# Patient Record
Sex: FEMALE | Race: WHITE | NOT HISPANIC OR LATINO
[De-identification: names, ages, dates, MRNs, and addresses within clinical notes are randomized per-mention and may not be internally consistent; named-entity substitution may affect disease eponyms.]

---

## 2017-06-28 ENCOUNTER — RESULT REVIEW (OUTPATIENT)
Age: 42
End: 2017-06-28

## 2017-07-03 ENCOUNTER — APPOINTMENT (OUTPATIENT)
Dept: MAMMOGRAPHY | Facility: CLINIC | Age: 42
End: 2017-07-03

## 2017-07-03 ENCOUNTER — OUTPATIENT (OUTPATIENT)
Dept: OUTPATIENT SERVICES | Facility: HOSPITAL | Age: 42
LOS: 1 days | End: 2017-07-03
Payer: COMMERCIAL

## 2017-07-03 DIAGNOSIS — R92.8 OTHER ABNORMAL AND INCONCLUSIVE FINDINGS ON DIAGNOSTIC IMAGING OF BREAST: ICD-10-CM

## 2017-07-03 PROCEDURE — G0279: CPT

## 2017-07-03 PROCEDURE — 77065 DX MAMMO INCL CAD UNI: CPT

## 2017-08-16 ENCOUNTER — INPATIENT (INPATIENT)
Facility: HOSPITAL | Age: 42
LOS: 1 days | Discharge: HOME | DRG: 392 | End: 2017-08-18
Attending: SPECIALIST | Admitting: SPECIALIST
Payer: COMMERCIAL

## 2017-08-16 VITALS
OXYGEN SATURATION: 100 % | SYSTOLIC BLOOD PRESSURE: 117 MMHG | RESPIRATION RATE: 16 BRPM | TEMPERATURE: 99 F | HEART RATE: 79 BPM | DIASTOLIC BLOOD PRESSURE: 77 MMHG

## 2017-08-16 DIAGNOSIS — K57.80 DIVERTICULITIS OF INTESTINE, PART UNSPECIFIED, WITH PERFORATION AND ABSCESS WITHOUT BLEEDING: ICD-10-CM

## 2017-08-16 LAB
ALBUMIN SERPL ELPH-MCNC: 4.3 G/DL — SIGNIFICANT CHANGE UP (ref 3.3–5)
ALP SERPL-CCNC: 95 U/L — SIGNIFICANT CHANGE UP (ref 40–120)
ALT FLD-CCNC: 13 U/L RC — SIGNIFICANT CHANGE UP (ref 10–45)
ANION GAP SERPL CALC-SCNC: 17 MMOL/L — SIGNIFICANT CHANGE UP (ref 5–17)
APTT BLD: 33.7 SEC — SIGNIFICANT CHANGE UP (ref 27.5–37.4)
AST SERPL-CCNC: 18 U/L — SIGNIFICANT CHANGE UP (ref 10–40)
BASE EXCESS BLDV CALC-SCNC: -1.3 MMOL/L — SIGNIFICANT CHANGE UP (ref -2–2)
BASOPHILS # BLD AUTO: 0.1 K/UL — SIGNIFICANT CHANGE UP (ref 0–0.2)
BASOPHILS NFR BLD AUTO: 0.6 % — SIGNIFICANT CHANGE UP (ref 0–2)
BILIRUB SERPL-MCNC: 0.6 MG/DL — SIGNIFICANT CHANGE UP (ref 0.2–1.2)
BLD GP AB SCN SERPL QL: NEGATIVE — SIGNIFICANT CHANGE UP
BUN SERPL-MCNC: 11 MG/DL — SIGNIFICANT CHANGE UP (ref 7–23)
CA-I SERPL-SCNC: 1.13 MMOL/L — SIGNIFICANT CHANGE UP (ref 1.12–1.3)
CALCIUM SERPL-MCNC: 9.6 MG/DL — SIGNIFICANT CHANGE UP (ref 8.4–10.5)
CHLORIDE BLDV-SCNC: 104 MMOL/L — SIGNIFICANT CHANGE UP (ref 96–108)
CHLORIDE SERPL-SCNC: 100 MMOL/L — SIGNIFICANT CHANGE UP (ref 96–108)
CO2 BLDV-SCNC: 25 MMOL/L — SIGNIFICANT CHANGE UP (ref 22–30)
CO2 SERPL-SCNC: 21 MMOL/L — LOW (ref 22–31)
CREAT SERPL-MCNC: 0.8 MG/DL — SIGNIFICANT CHANGE UP (ref 0.5–1.3)
EOSINOPHIL # BLD AUTO: 0 K/UL — SIGNIFICANT CHANGE UP (ref 0–0.5)
EOSINOPHIL NFR BLD AUTO: 0.4 % — SIGNIFICANT CHANGE UP (ref 0–6)
GAS PNL BLDV: 132 MMOL/L — LOW (ref 136–145)
GAS PNL BLDV: SIGNIFICANT CHANGE UP
GLUCOSE BLDV-MCNC: 82 MG/DL — SIGNIFICANT CHANGE UP (ref 70–99)
GLUCOSE SERPL-MCNC: 89 MG/DL — SIGNIFICANT CHANGE UP (ref 70–99)
HCG SERPL-ACNC: <2 MIU/ML — SIGNIFICANT CHANGE UP
HCO3 BLDV-SCNC: 24 MMOL/L — SIGNIFICANT CHANGE UP (ref 21–29)
HCT VFR BLD CALC: 39 % — SIGNIFICANT CHANGE UP (ref 34.5–45)
HCT VFR BLDA CALC: 34 % — LOW (ref 39–50)
HGB BLD CALC-MCNC: 10.8 G/DL — LOW (ref 11.5–15.5)
HGB BLD-MCNC: 12.8 G/DL — SIGNIFICANT CHANGE UP (ref 11.5–15.5)
INR BLD: 1.08 RATIO — SIGNIFICANT CHANGE UP (ref 0.88–1.16)
LACTATE BLDV-MCNC: 1.1 MMOL/L — SIGNIFICANT CHANGE UP (ref 0.7–2)
LYMPHOCYTES # BLD AUTO: 19.2 % — SIGNIFICANT CHANGE UP (ref 13–44)
LYMPHOCYTES # BLD AUTO: 2.5 K/UL — SIGNIFICANT CHANGE UP (ref 1–3.3)
MCHC RBC-ENTMCNC: 30 PG — SIGNIFICANT CHANGE UP (ref 27–34)
MCHC RBC-ENTMCNC: 32.9 GM/DL — SIGNIFICANT CHANGE UP (ref 32–36)
MCV RBC AUTO: 91.3 FL — SIGNIFICANT CHANGE UP (ref 80–100)
MONOCYTES # BLD AUTO: 0.7 K/UL — SIGNIFICANT CHANGE UP (ref 0–0.9)
MONOCYTES NFR BLD AUTO: 5.4 % — SIGNIFICANT CHANGE UP (ref 2–14)
NEUTROPHILS # BLD AUTO: 9.8 K/UL — HIGH (ref 1.8–7.4)
NEUTROPHILS NFR BLD AUTO: 74.4 % — SIGNIFICANT CHANGE UP (ref 43–77)
OTHER CELLS CSF MANUAL: 11 ML/DL — LOW (ref 18–22)
PCO2 BLDV: 43 MMHG — SIGNIFICANT CHANGE UP (ref 35–50)
PH BLDV: 7.36 — SIGNIFICANT CHANGE UP (ref 7.35–7.45)
PLATELET # BLD AUTO: 268 K/UL — SIGNIFICANT CHANGE UP (ref 150–400)
PO2 BLDV: 47 MMHG — HIGH (ref 25–45)
POTASSIUM BLDV-SCNC: 3.8 MMOL/L — SIGNIFICANT CHANGE UP (ref 3.5–5)
POTASSIUM SERPL-MCNC: 4.3 MMOL/L — SIGNIFICANT CHANGE UP (ref 3.5–5.3)
POTASSIUM SERPL-SCNC: 4.3 MMOL/L — SIGNIFICANT CHANGE UP (ref 3.5–5.3)
PROT SERPL-MCNC: 7.7 G/DL — SIGNIFICANT CHANGE UP (ref 6–8.3)
PROTHROM AB SERPL-ACNC: 11.8 SEC — SIGNIFICANT CHANGE UP (ref 9.8–12.7)
RBC # BLD: 4.27 M/UL — SIGNIFICANT CHANGE UP (ref 3.8–5.2)
RBC # FLD: 12 % — SIGNIFICANT CHANGE UP (ref 10.3–14.5)
RH IG SCN BLD-IMP: POSITIVE — SIGNIFICANT CHANGE UP
SAO2 % BLDV: 75 % — SIGNIFICANT CHANGE UP (ref 67–88)
SODIUM SERPL-SCNC: 138 MMOL/L — SIGNIFICANT CHANGE UP (ref 135–145)
WBC # BLD: 13.2 K/UL — HIGH (ref 3.8–10.5)
WBC # FLD AUTO: 13.2 K/UL — HIGH (ref 3.8–10.5)

## 2017-08-16 PROCEDURE — 99285 EMERGENCY DEPT VISIT HI MDM: CPT

## 2017-08-16 RX ORDER — CIPROFLOXACIN LACTATE 400MG/40ML
VIAL (ML) INTRAVENOUS
Qty: 0 | Refills: 0 | Status: DISCONTINUED | OUTPATIENT
Start: 2017-08-17 | End: 2017-08-18

## 2017-08-16 RX ORDER — METRONIDAZOLE 500 MG
500 TABLET ORAL EVERY 8 HOURS
Qty: 0 | Refills: 0 | Status: DISCONTINUED | OUTPATIENT
Start: 2017-08-17 | End: 2017-08-18

## 2017-08-16 RX ORDER — ENOXAPARIN SODIUM 100 MG/ML
40 INJECTION SUBCUTANEOUS DAILY
Qty: 0 | Refills: 0 | Status: DISCONTINUED | OUTPATIENT
Start: 2017-08-16 | End: 2017-08-18

## 2017-08-16 RX ORDER — SODIUM CHLORIDE 9 MG/ML
1000 INJECTION INTRAMUSCULAR; INTRAVENOUS; SUBCUTANEOUS ONCE
Qty: 0 | Refills: 0 | Status: COMPLETED | OUTPATIENT
Start: 2017-08-16 | End: 2017-08-16

## 2017-08-16 RX ORDER — PIPERACILLIN AND TAZOBACTAM 4; .5 G/20ML; G/20ML
3.38 INJECTION, POWDER, LYOPHILIZED, FOR SOLUTION INTRAVENOUS ONCE
Qty: 0 | Refills: 0 | Status: COMPLETED | OUTPATIENT
Start: 2017-08-16 | End: 2017-08-16

## 2017-08-16 RX ORDER — CIPROFLOXACIN LACTATE 400MG/40ML
400 VIAL (ML) INTRAVENOUS EVERY 12 HOURS
Qty: 0 | Refills: 0 | Status: DISCONTINUED | OUTPATIENT
Start: 2017-08-17 | End: 2017-08-18

## 2017-08-16 RX ORDER — METRONIDAZOLE 500 MG
TABLET ORAL
Qty: 0 | Refills: 0 | Status: DISCONTINUED | OUTPATIENT
Start: 2017-08-17 | End: 2017-08-18

## 2017-08-16 RX ORDER — CIPROFLOXACIN LACTATE 400MG/40ML
400 VIAL (ML) INTRAVENOUS ONCE
Qty: 0 | Refills: 0 | Status: COMPLETED | OUTPATIENT
Start: 2017-08-16 | End: 2017-08-16

## 2017-08-16 RX ORDER — METRONIDAZOLE 500 MG
500 TABLET ORAL ONCE
Qty: 0 | Refills: 0 | Status: COMPLETED | OUTPATIENT
Start: 2017-08-16 | End: 2017-08-16

## 2017-08-16 RX ORDER — ONDANSETRON 8 MG/1
4 TABLET, FILM COATED ORAL ONCE
Qty: 0 | Refills: 0 | Status: COMPLETED | OUTPATIENT
Start: 2017-08-16 | End: 2017-08-16

## 2017-08-16 RX ORDER — SODIUM CHLORIDE 9 MG/ML
3 INJECTION INTRAMUSCULAR; INTRAVENOUS; SUBCUTANEOUS ONCE
Qty: 0 | Refills: 0 | Status: COMPLETED | OUTPATIENT
Start: 2017-08-16 | End: 2017-08-16

## 2017-08-16 RX ORDER — ACETAMINOPHEN 500 MG
1000 TABLET ORAL ONCE
Qty: 0 | Refills: 0 | Status: COMPLETED | OUTPATIENT
Start: 2017-08-16 | End: 2017-08-16

## 2017-08-16 RX ORDER — SODIUM CHLORIDE 9 MG/ML
1000 INJECTION, SOLUTION INTRAVENOUS
Qty: 0 | Refills: 0 | Status: DISCONTINUED | OUTPATIENT
Start: 2017-08-16 | End: 2017-08-17

## 2017-08-16 RX ADMIN — ONDANSETRON 4 MILLIGRAM(S): 8 TABLET, FILM COATED ORAL at 19:07

## 2017-08-16 RX ADMIN — SODIUM CHLORIDE 1000 MILLILITER(S): 9 INJECTION INTRAMUSCULAR; INTRAVENOUS; SUBCUTANEOUS at 18:44

## 2017-08-16 RX ADMIN — Medication 100 MILLIGRAM(S): at 23:58

## 2017-08-16 RX ADMIN — PIPERACILLIN AND TAZOBACTAM 200 GRAM(S): 4; .5 INJECTION, POWDER, LYOPHILIZED, FOR SOLUTION INTRAVENOUS at 20:15

## 2017-08-16 RX ADMIN — Medication 400 MILLIGRAM(S): at 19:07

## 2017-08-16 RX ADMIN — SODIUM CHLORIDE 3 MILLILITER(S): 9 INJECTION INTRAMUSCULAR; INTRAVENOUS; SUBCUTANEOUS at 18:24

## 2017-08-16 RX ADMIN — Medication 200 MILLIGRAM(S): at 23:58

## 2017-08-16 RX ADMIN — Medication 1000 MILLIGRAM(S): at 20:08

## 2017-08-16 NOTE — ED PROVIDER NOTE - OBJECTIVE STATEMENT
41 year old female w Hx of 2 previous episodes of Diverticulitis (february treated with Cipro/flagyl, April treated with augmentin) presents with complaint of abdominal pain which began yesterday associated with chills. Denies fever, nausea, vomiting. She went to her GI Dr Sebastian Hutchison who instructed her to come to the ED for IV antibiotics after CT scan of abdomen revealed Diverticulitis with abscess formation.

## 2017-08-16 NOTE — ED ADULT NURSE NOTE - OBJECTIVE STATEMENT
41 year old female a/ox3 ambulatory  at bedside presenting to ed with abd pain and chills  that started yesterday  and dx diverticulitis. patient with Hx of 2 previous episodes of Diverticulitis (february treated with Cipro/flagyl, April treated with Augmentin)  Denies fever, nausea, vomiting.  outpatient CT scan of abdomen today  revealed Diverticulitis with abscess formation.

## 2017-08-16 NOTE — ED PROVIDER NOTE - ATTENDING CONTRIBUTION TO CARE
Patient with history of diverticulitis presenting with abdominal pain, had outpatient CT demonstrating diverticulitis with abscess sent for admission.  On exam patient well appearing, vital signs within normal limits, RRR S1/S2, lungs clear to ascultation bilaterally, abdomen soft but tenderness to palpation LLQ.  Will obtain labs, consult general surgery, antibiotics, admit.

## 2017-08-17 DIAGNOSIS — Z90.10 ACQUIRED ABSENCE OF UNSPECIFIED BREAST AND NIPPLE: Chronic | ICD-10-CM

## 2017-08-17 DIAGNOSIS — Z98.890 OTHER SPECIFIED POSTPROCEDURAL STATES: Chronic | ICD-10-CM

## 2017-08-17 LAB
ANION GAP SERPL CALC-SCNC: 12 MMOL/L — SIGNIFICANT CHANGE UP (ref 5–17)
BUN SERPL-MCNC: 9 MG/DL — SIGNIFICANT CHANGE UP (ref 7–23)
CALCIUM SERPL-MCNC: 9 MG/DL — SIGNIFICANT CHANGE UP (ref 8.4–10.5)
CHLORIDE SERPL-SCNC: 103 MMOL/L — SIGNIFICANT CHANGE UP (ref 96–108)
CO2 SERPL-SCNC: 25 MMOL/L — SIGNIFICANT CHANGE UP (ref 22–31)
CREAT SERPL-MCNC: 0.83 MG/DL — SIGNIFICANT CHANGE UP (ref 0.5–1.3)
GLUCOSE SERPL-MCNC: 115 MG/DL — HIGH (ref 70–99)
HCT VFR BLD CALC: 34.3 % — LOW (ref 34.5–45)
HGB BLD-MCNC: 10.9 G/DL — LOW (ref 11.5–15.5)
MAGNESIUM SERPL-MCNC: 2.4 MG/DL — SIGNIFICANT CHANGE UP (ref 1.6–2.6)
MCHC RBC-ENTMCNC: 28.2 PG — SIGNIFICANT CHANGE UP (ref 27–34)
MCHC RBC-ENTMCNC: 31.8 GM/DL — LOW (ref 32–36)
MCV RBC AUTO: 88.9 FL — SIGNIFICANT CHANGE UP (ref 80–100)
PHOSPHATE SERPL-MCNC: 2.8 MG/DL — SIGNIFICANT CHANGE UP (ref 2.5–4.5)
PLATELET # BLD AUTO: 276 K/UL — SIGNIFICANT CHANGE UP (ref 150–400)
POTASSIUM SERPL-MCNC: 4.3 MMOL/L — SIGNIFICANT CHANGE UP (ref 3.5–5.3)
POTASSIUM SERPL-SCNC: 4.3 MMOL/L — SIGNIFICANT CHANGE UP (ref 3.5–5.3)
RBC # BLD: 3.86 M/UL — SIGNIFICANT CHANGE UP (ref 3.8–5.2)
RBC # FLD: 13.4 % — SIGNIFICANT CHANGE UP (ref 10.3–14.5)
SODIUM SERPL-SCNC: 140 MMOL/L — SIGNIFICANT CHANGE UP (ref 135–145)
WBC # BLD: 9.88 K/UL — SIGNIFICANT CHANGE UP (ref 3.8–10.5)
WBC # FLD AUTO: 9.88 K/UL — SIGNIFICANT CHANGE UP (ref 3.8–10.5)

## 2017-08-17 RX ORDER — DEXTROSE MONOHYDRATE, SODIUM CHLORIDE, AND POTASSIUM CHLORIDE 50; .745; 4.5 G/1000ML; G/1000ML; G/1000ML
1000 INJECTION, SOLUTION INTRAVENOUS
Qty: 0 | Refills: 0 | Status: DISCONTINUED | OUTPATIENT
Start: 2017-08-17 | End: 2017-08-18

## 2017-08-17 RX ORDER — ACETAMINOPHEN 500 MG
1000 TABLET ORAL ONCE
Qty: 0 | Refills: 0 | Status: COMPLETED | OUTPATIENT
Start: 2017-08-17 | End: 2017-08-17

## 2017-08-17 RX ADMIN — Medication 400 MILLIGRAM(S): at 23:05

## 2017-08-17 RX ADMIN — Medication 100 MILLIGRAM(S): at 13:30

## 2017-08-17 RX ADMIN — Medication 200 MILLIGRAM(S): at 05:26

## 2017-08-17 RX ADMIN — Medication 1000 MILLIGRAM(S): at 23:35

## 2017-08-17 RX ADMIN — Medication 200 MILLIGRAM(S): at 17:28

## 2017-08-17 RX ADMIN — Medication 100 MILLIGRAM(S): at 22:03

## 2017-08-17 RX ADMIN — DEXTROSE MONOHYDRATE, SODIUM CHLORIDE, AND POTASSIUM CHLORIDE 100 MILLILITER(S): 50; .745; 4.5 INJECTION, SOLUTION INTRAVENOUS at 08:50

## 2017-08-17 RX ADMIN — Medication 400 MILLIGRAM(S): at 12:33

## 2017-08-17 RX ADMIN — Medication 100 MILLIGRAM(S): at 05:26

## 2017-08-17 NOTE — H&P ADULT - HISTORY OF PRESENT ILLNESS
42 y/o female with with h/o diverticulitis (2 prior episodes; has not required prior hospitalization) now p/w recurrent sigmoid diverticulitis. Pt began feeling abdominal pain & pressure 2 days prior which has progressively worsened, now plateaued. She denies nausea/emesis, no fevers/chills. She reports diarrhea only after drinking po contrast for her outpatient CT scan.     Last colonoscopy 1 week prior; 2 polyps removed & sent for pathology.

## 2017-08-17 NOTE — PROGRESS NOTE ADULT - ATTENDING COMMENTS
Pt. reports her first attack of CT documented diverticulitis in Feb. 2017. Responded to abx and had a second attack in April.  Had a colonoscopy with polypectomy 1 week ago and developed recurrent lower abdominal pain 2 days ago. No fever or chills and moving bowels. Saw her GI Dr. Hutchison yesterday and a CT revealed sigmoid diverticulitis wit a 3 cm abscess.   Feels a little better today.  PMH- denies  PSH- denies  AVSS  comfortable in bed  Cor- reg  Lungs-unlabored breathing  Abd- soft, nondistended with lower and mid abdominal tenderness-mild guarding-no rebound  Extrems-NT    Imp- diverticulitis with perf and abscess  Plan- IV abx,check repeat labs,NPO for now  if feeling better later today can start on clears  D/W patient that she will need an elective resection.

## 2017-08-17 NOTE — H&P ADULT - NSHPLABSRESULTS_GEN_ALL_CORE
10.9   9.88  )-----------( 276      ( 17 Aug 2017 08:36 )             34.3       08-17    140  |  103  |  9   ----------------------------<  115<H>  4.3   |  25  |  0.83    Ca    9.0      17 Aug 2017 08:34  Phos  2.8     08-17  Mg     2.4     08-17    TPro  7.7  /  Alb  4.3  /  TBili  0.6  /  DBili  x   /  AST  18  /  ALT  13  /  AlkPhos  95  08-16                  PT/INR - ( 16 Aug 2017 18:31 )   PT: 11.8 sec;   INR: 1.08 ratio         PTT - ( 16 Aug 2017 18:31 )  PTT:33.7 sec    Imaging:  CT abd/pelvis: Sigmoid diverticulitis with small intramural abscess. B/L ovarian cysts

## 2017-08-17 NOTE — H&P ADULT - NSHPPHYSICALEXAM_GEN_ALL_CORE
General: alert and oriented, NAD  Resp: airway patent, respirations unlabored  CVS: regular rate and rhythm  Abdomen: soft, minimally distended. Pt with main focus of TTP in diana-umbilical region with mild rebound TTP in this area  Extremities: no edema  Skin: warm, dry, appropriate color

## 2017-08-17 NOTE — CHART NOTE - NSCHARTNOTEFT_GEN_A_CORE
Patient was seen at bedside. In no apparent distress. Pain well controlled.  Abdomen was soft with mild distension. Tenderness to palpation on the right and LLQ. Denies nausea/vomiting. Passing flatus but no BM yet. + BS

## 2017-08-17 NOTE — H&P ADULT - ASSESSMENT
40 y/o female with 3rd episode of diverticulitis; now p/w Hinchey II diverticulitis.     - NPO with IVF  - IV abx  - Serial abd exams  - f/u pathology from colonoscopy  - D/w PA on call for Dr. Friedman    -Jose Miguel Kearney, PGY-4  p [phone]

## 2017-08-18 ENCOUNTER — TRANSCRIPTION ENCOUNTER (OUTPATIENT)
Age: 42
End: 2017-08-18

## 2017-08-18 VITALS
RESPIRATION RATE: 18 BRPM | SYSTOLIC BLOOD PRESSURE: 114 MMHG | DIASTOLIC BLOOD PRESSURE: 76 MMHG | HEART RATE: 53 BPM | OXYGEN SATURATION: 97 % | TEMPERATURE: 98 F

## 2017-08-18 LAB
ANION GAP SERPL CALC-SCNC: 14 MMOL/L — SIGNIFICANT CHANGE UP (ref 5–17)
BUN SERPL-MCNC: 7 MG/DL — SIGNIFICANT CHANGE UP (ref 7–23)
CALCIUM SERPL-MCNC: 8.8 MG/DL — SIGNIFICANT CHANGE UP (ref 8.4–10.5)
CHLORIDE SERPL-SCNC: 102 MMOL/L — SIGNIFICANT CHANGE UP (ref 96–108)
CO2 SERPL-SCNC: 23 MMOL/L — SIGNIFICANT CHANGE UP (ref 22–31)
CREAT SERPL-MCNC: 0.68 MG/DL — SIGNIFICANT CHANGE UP (ref 0.5–1.3)
GLUCOSE SERPL-MCNC: 91 MG/DL — SIGNIFICANT CHANGE UP (ref 70–99)
HCT VFR BLD CALC: 32.8 % — LOW (ref 34.5–45)
HGB BLD-MCNC: 10.4 G/DL — LOW (ref 11.5–15.5)
MAGNESIUM SERPL-MCNC: 2.1 MG/DL — SIGNIFICANT CHANGE UP (ref 1.6–2.6)
MCHC RBC-ENTMCNC: 28.7 PG — SIGNIFICANT CHANGE UP (ref 27–34)
MCHC RBC-ENTMCNC: 31.7 GM/DL — LOW (ref 32–36)
MCV RBC AUTO: 90.6 FL — SIGNIFICANT CHANGE UP (ref 80–100)
PHOSPHATE SERPL-MCNC: 2.9 MG/DL — SIGNIFICANT CHANGE UP (ref 2.5–4.5)
PLATELET # BLD AUTO: 261 K/UL — SIGNIFICANT CHANGE UP (ref 150–400)
POTASSIUM SERPL-MCNC: 4.2 MMOL/L — SIGNIFICANT CHANGE UP (ref 3.5–5.3)
POTASSIUM SERPL-SCNC: 4.2 MMOL/L — SIGNIFICANT CHANGE UP (ref 3.5–5.3)
RBC # BLD: 3.62 M/UL — LOW (ref 3.8–5.2)
RBC # FLD: 13.4 % — SIGNIFICANT CHANGE UP (ref 10.3–14.5)
SODIUM SERPL-SCNC: 139 MMOL/L — SIGNIFICANT CHANGE UP (ref 135–145)
WBC # BLD: 6.79 K/UL — SIGNIFICANT CHANGE UP (ref 3.8–10.5)
WBC # FLD AUTO: 6.79 K/UL — SIGNIFICANT CHANGE UP (ref 3.8–10.5)

## 2017-08-18 PROCEDURE — 85730 THROMBOPLASTIN TIME PARTIAL: CPT

## 2017-08-18 PROCEDURE — 82435 ASSAY OF BLOOD CHLORIDE: CPT

## 2017-08-18 PROCEDURE — 80048 BASIC METABOLIC PNL TOTAL CA: CPT

## 2017-08-18 PROCEDURE — 83735 ASSAY OF MAGNESIUM: CPT

## 2017-08-18 PROCEDURE — 86900 BLOOD TYPING SEROLOGIC ABO: CPT

## 2017-08-18 PROCEDURE — 85610 PROTHROMBIN TIME: CPT

## 2017-08-18 PROCEDURE — 87040 BLOOD CULTURE FOR BACTERIA: CPT

## 2017-08-18 PROCEDURE — 85014 HEMATOCRIT: CPT

## 2017-08-18 PROCEDURE — 86850 RBC ANTIBODY SCREEN: CPT

## 2017-08-18 PROCEDURE — 84100 ASSAY OF PHOSPHORUS: CPT

## 2017-08-18 PROCEDURE — 99285 EMERGENCY DEPT VISIT HI MDM: CPT | Mod: 25

## 2017-08-18 PROCEDURE — 85027 COMPLETE CBC AUTOMATED: CPT

## 2017-08-18 PROCEDURE — 96374 THER/PROPH/DIAG INJ IV PUSH: CPT

## 2017-08-18 PROCEDURE — 82803 BLOOD GASES ANY COMBINATION: CPT

## 2017-08-18 PROCEDURE — 83605 ASSAY OF LACTIC ACID: CPT

## 2017-08-18 PROCEDURE — 84295 ASSAY OF SERUM SODIUM: CPT

## 2017-08-18 PROCEDURE — 84132 ASSAY OF SERUM POTASSIUM: CPT

## 2017-08-18 PROCEDURE — 82947 ASSAY GLUCOSE BLOOD QUANT: CPT

## 2017-08-18 PROCEDURE — 84702 CHORIONIC GONADOTROPIN TEST: CPT

## 2017-08-18 PROCEDURE — 80053 COMPREHEN METABOLIC PANEL: CPT

## 2017-08-18 PROCEDURE — 96375 TX/PRO/DX INJ NEW DRUG ADDON: CPT

## 2017-08-18 PROCEDURE — 86901 BLOOD TYPING SEROLOGIC RH(D): CPT

## 2017-08-18 PROCEDURE — 82330 ASSAY OF CALCIUM: CPT

## 2017-08-18 RX ORDER — MOXIFLOXACIN HYDROCHLORIDE TABLETS, 400 MG 400 MG/1
1 TABLET, FILM COATED ORAL
Qty: 24 | Refills: 0 | OUTPATIENT
Start: 2017-08-18 | End: 2017-08-30

## 2017-08-18 RX ORDER — METRONIDAZOLE 500 MG
1 TABLET ORAL
Qty: 36 | Refills: 0 | OUTPATIENT
Start: 2017-08-18 | End: 2017-08-30

## 2017-08-18 RX ORDER — METRONIDAZOLE 500 MG
500 TABLET ORAL EVERY 8 HOURS
Qty: 0 | Refills: 0 | Status: DISCONTINUED | OUTPATIENT
Start: 2017-08-18 | End: 2017-08-18

## 2017-08-18 RX ADMIN — Medication 100 MILLIGRAM(S): at 05:16

## 2017-08-18 RX ADMIN — Medication 1 TABLET(S): at 12:18

## 2017-08-18 RX ADMIN — Medication 200 MILLIGRAM(S): at 06:19

## 2017-08-18 NOTE — DISCHARGE NOTE ADULT - CARE PLAN
Principal Discharge DX:	Diverticulitis of intestine with abscess, unspecified bleeding status, unspecified part of intestinal tract  Goal:	continue antibiotics as prescribed  Instructions for follow-up, activity and diet:	Please follow up with Dr Friedman in 10 days--call for any exacerbation of symptoms. You will need a segmental sigmoid resection in approx 6 weeks.  Follow up with Dr Hutchison to discuss your pathology results (colonoscopy)

## 2017-08-18 NOTE — DISCHARGE NOTE ADULT - MEDICATION SUMMARY - MEDICATIONS TO TAKE
I will START or STAY ON the medications listed below when I get home from the hospital:    Augmentin 875 mg-125 mg oral tablet  -- 1 tab(s) by mouth every 12 hours  -- Finish all this medication unless otherwise directed by prescriber.  Take with food or milk.    -- Indication: For Diverticulitis

## 2017-08-18 NOTE — PROGRESS NOTE ADULT - ASSESSMENT
41 year odl woman with complicted diverticulitis and is imrpoving on IV antibiotics  appreciate surgical care and defer to surgical care regarding diet and duration of abx  when transitioning to po antibiotics favor augmentin as had issues with flagyl in past.   f/u with Dr. Friedman.   call with questions  [phone]
41 year old woman with recurrent diverticulitis now with abscess.  Somewhat improved on IV Abx but remains with significant tenderness.  non-toxic appearing at this time with improved WBC.  Continue conservative care with IV Abx, NPO, analgesia.   Eventual segmental sigmoid resection.  Appreciate surgical care.
41F who presented  with acute diverticulitis    - NPO  - IV abx  - DVT ppx  - advance diet if tolerating
41F who presented  with acute diverticulitis,     - CLD  - Pain control  - IV abx  - Lovenox  - Plan for elective resection at future date

## 2017-08-18 NOTE — DISCHARGE NOTE ADULT - CARE PROVIDER_API CALL
Shiva Friedman), ColonRectal Surgery; Surgery  [address] [place] [address]  Phone: [phone]  Fax: [phone]    Sebastian Hutchison), Internal Medicine  [address] [place] [address]  Phone: [phone]  Fax: [phone]

## 2017-08-18 NOTE — DISCHARGE NOTE ADULT - HOSPITAL COURSE
40 y/o female with h/o diverticulitis (2 prior episodes; has not required prior hospitalization) now p/w recurrent sigmoid diverticulitis.   Pt had a colonoscopy with polypectomy 1 week ago and developed recurrent lower abdominal pain 2 days ago. Pt saw her GI doc who ordered an outpatient CT which showed a Sigmoid diverticulitis with 3cm small intramural abscess. B/L ovarian cysts.  Pt was admitted and placed on IV Abx and NPO.  Abscess too small for IR to drain.  Once pain decreased, diet was advanced as tolerated.  At the time of discharge, the patient was hemodynamically stable, was tolerating PO diet, was voiding urine, was ambulating, and was comfortable with adequate pain control. The patient was instructed to follow up with Dr. Friedman 2 weeks after discharge from the hospital. The patient/family felt comfortable with discharge. The patient was discharged to home on PO Abx course. The patient had no other issues.

## 2017-08-18 NOTE — DISCHARGE NOTE ADULT - PRINCIPAL DIAGNOSIS
Diverticulitis of intestine with abscess, unspecified bleeding status, unspecified part of intestinal tract

## 2017-08-18 NOTE — DISCHARGE NOTE ADULT - CARE PROVIDERS DIRECT ADDRESSES
[email or web address]

## 2017-08-18 NOTE — PROGRESS NOTE ADULT - ATTENDING COMMENTS
at bedside  feels much better. Passing flatus but no BM.  Tolerated clears yesterday and feels hungry now  Afeb vss  Cor reg  Abd- soft and mildly distended. Mild LL tenderness without rebound or guarding  Extrems NT    Plan--LR diet, ok to D/C home later today if tolerates  finish 2 week course of ABX  F/U in office in about 10 days-call for any exacerbation of symptoms  Need for surgery discussed-probably in about 6 weeks

## 2017-08-18 NOTE — DISCHARGE NOTE ADULT - PATIENT PORTAL LINK FT
“You can access the Follow[employer] Patient Portal, offered by [facility], by registering with the following website: [email or web address]”

## 2017-08-18 NOTE — PROGRESS NOTE ADULT - SUBJECTIVE AND OBJECTIVE BOX
INTERVAL HPI/OVERNIGHT EVENTS:  eating today. feeling better. pain imrpoving not compeltely resolved. positive flatus    MEDICATIONS  (STANDING):  ciprofloxacin   IVPB   IV Intermittent   metroNIDAZOLE  IVPB   IV Intermittent   enoxaparin Injectable 40 milliGRAM(s) SubCutaneous daily  ciprofloxacin   IVPB 400 milliGRAM(s) IV Intermittent every 12 hours  metroNIDAZOLE  IVPB 500 milliGRAM(s) IV Intermittent every 8 hours    MEDICATIONS  (PRN):      Allergies    No Known Allergies    Intolerances            PHYSICAL EXAM:  General: comfortable in No acute distress  CV: regular rate and rhythm. no murmurs rubs or gallops  Lungs: clear to ascultation bilaterally  abdomen: soft mild tenderness to palpation with slight rebound nondistened normal bowel sounds  ext: negative edema  skin: no rashes noted      LABS:                        10.9   9.88  )-----------( 276      ( 17 Aug 2017 08:36 )             34.3     08-17    140  |  103  |  9   ----------------------------<  115<H>  4.3   |  25  |  0.83    Ca    9.0      17 Aug 2017 08:34  Phos  2.8     08-17  Mg     2.4     08-17    TPro  7.7  /  Alb  4.3  /  TBili  0.6  /  DBili  x   /  AST  18  /  ALT  13  /  AlkPhos  95  08-16    PT/INR - ( 16 Aug 2017 18:31 )   PT: 11.8 sec;   INR: 1.08 ratio         PTT - ( 16 Aug 2017 18:31 )  PTT:33.7 sec    LIVER FUNCTIONS - ( 16 Aug 2017 18:31 )  Alb: 4.3 g/dL / Pro: 7.7 g/dL / ALK PHOS: 95 U/L / ALT: 13 U/L RC / AST: 18 U/L / GGT: x             RADIOLOGY & ADDITIONAL TESTS:
GREEN SURGERY PROGRESS NOTE      SUBJECTIVE: No acute events o/n    Vital Signs Last 24 Hrs  T(C): 36.6 (17 Aug 2017 05:24), Max: 37.1 (16 Aug 2017 17:23)  T(F): 97.8 (17 Aug 2017 05:24), Max: 98.8 (16 Aug 2017 17:23)  HR: 57 (17 Aug 2017 05:24) (57 - 79)  BP: 101/62 (17 Aug 2017 05:24) (101/62 - 117/77)  BP(mean): --  RR: 18 (17 Aug 2017 05:24) (16 - 20)  SpO2: 98% (17 Aug 2017 05:24) (95% - 100%)    Physical Exam  General: awake, alert,    Pulm: respirations unlabored, no increased WOB  Abdomen: soft, mildly distended, umbilical tenderness   Extremities: Grossly symmetric    I&O's Summary    16 Aug 2017 07:01  -  17 Aug 2017 06:53  --------------------------------------------------------  IN: 1275 mL / OUT: 400 mL / NET: 875 mL      I&O's Detail    16 Aug 2017 07:01  -  17 Aug 2017 06:53  --------------------------------------------------------  IN:    lactated ringers.: 675 mL    Solution: 200 mL    Solution: 400 mL  Total IN: 1275 mL    OUT:    Voided: 400 mL  Total OUT: 400 mL    Total NET: 875 mL          MEDICATIONS  (STANDING):  ciprofloxacin   IVPB   IV Intermittent   metroNIDAZOLE  IVPB   IV Intermittent   lactated ringers. 1000 milliLiter(s) (125 mL/Hr) IV Continuous <Continuous>  enoxaparin Injectable 40 milliGRAM(s) SubCutaneous daily  ciprofloxacin   IVPB 400 milliGRAM(s) IV Intermittent every 12 hours  metroNIDAZOLE  IVPB 500 milliGRAM(s) IV Intermittent every 8 hours    MEDICATIONS  (PRN):      LABS:                        12.8   13.2  )-----------( 268      ( 16 Aug 2017 18:31 )             39.0     08-16    138  |  100  |  11  ----------------------------<  89  4.3   |  21<L>  |  0.80    Ca    9.6      16 Aug 2017 18:31    TPro  7.7  /  Alb  4.3  /  TBili  0.6  /  DBili  x   /  AST  18  /  ALT  13  /  AlkPhos  95  08-16    PT/INR - ( 16 Aug 2017 18:31 )   PT: 11.8 sec;   INR: 1.08 ratio         PTT - ( 16 Aug 2017 18:31 )  PTT:33.7 sec      RADIOLOGY & ADDITIONAL STUDIES:
GREEN SURGERY PROGRESS NOTE      SUBJECTIVE: Tolerating CLD. Received IV Tylenol for pain which resolved o/n. Passing flatus, no BM    Vital Signs Last 24 Hrs  T(C): 36.6 (18 Aug 2017 01:06), Max: 36.9 (17 Aug 2017 13:30)  T(F): 97.8 (18 Aug 2017 01:06), Max: 98.5 (17 Aug 2017 13:30)  HR: 54 (18 Aug 2017 01:06) (52 - 63)  BP: 105/66 (18 Aug 2017 01:06) (101/59 - 105/66)  BP(mean): --  RR: 18 (18 Aug 2017 01:06) (18 - 18)  SpO2: 96% (18 Aug 2017 01:06) (95% - 97%)    Physical Exam  General: awake, alert,    Pulm: respirations unlabored, no increased WOB  Abdomen: soft, mildly distended, umbilical tenderness with rebound   Extremities: Grossly symmetric    I&O's Summary    16 Aug 2017 07:01  -  17 Aug 2017 07:00  --------------------------------------------------------  IN: 1275 mL / OUT: 400 mL / NET: 875 mL    17 Aug 2017 07:01  -  18 Aug 2017 05:47  --------------------------------------------------------  IN: 2475 mL / OUT: 1750 mL / NET: 725 mL      I&O's Detail    16 Aug 2017 07:01  -  17 Aug 2017 07:00  --------------------------------------------------------  IN:    lactated ringers.: 675 mL    Solution: 200 mL    Solution: 400 mL  Total IN: 1275 mL    OUT:    Voided: 400 mL  Total OUT: 400 mL    Total NET: 875 mL      17 Aug 2017 07:01  -  18 Aug 2017 05:47  --------------------------------------------------------  IN:    dextrose 5% + sodium chloride 0.45% with potassium chloride 20 mEq/L: 1575 mL    Solution: 400 mL    Solution: 400 mL    Solution: 100 mL  Total IN: 2475 mL    OUT:    Voided: 1750 mL  Total OUT: 1750 mL    Total NET: 725 mL          MEDICATIONS  (STANDING):  ciprofloxacin   IVPB   IV Intermittent   metroNIDAZOLE  IVPB   IV Intermittent   enoxaparin Injectable 40 milliGRAM(s) SubCutaneous daily  ciprofloxacin   IVPB 400 milliGRAM(s) IV Intermittent every 12 hours  metroNIDAZOLE  IVPB 500 milliGRAM(s) IV Intermittent every 8 hours  dextrose 5% + sodium chloride 0.45% with potassium chloride 20 mEq/L 1000 milliLiter(s) (50 mL/Hr) IV Continuous <Continuous>    MEDICATIONS  (PRN):      LABS:                        10.9   9.88  )-----------( 276      ( 17 Aug 2017 08:36 )             34.3     08-17    140  |  103  |  9   ----------------------------<  115<H>  4.3   |  25  |  0.83    Ca    9.0      17 Aug 2017 08:34  Phos  2.8     08-17  Mg     2.4     08-17    TPro  7.7  /  Alb  4.3  /  TBili  0.6  /  DBili  x   /  AST  18  /  ALT  13  /  AlkPhos  95  08-16    PT/INR - ( 16 Aug 2017 18:31 )   PT: 11.8 sec;   INR: 1.08 ratio         PTT - ( 16 Aug 2017 18:31 )  PTT:33.7 sec      RADIOLOGY & ADDITIONAL STUDIES:
INTERVAL HPI/OVERNIGHT EVENTS:  Feeling better but remains with significant abdominal pain.  No F/C.  no BM overnight.  Has not required narcotics for pain.    MEDICATIONS  (STANDING):  ciprofloxacin   IVPB   IV Intermittent   metroNIDAZOLE  IVPB   IV Intermittent   enoxaparin Injectable 40 milliGRAM(s) SubCutaneous daily  ciprofloxacin   IVPB 400 milliGRAM(s) IV Intermittent every 12 hours  metroNIDAZOLE  IVPB 500 milliGRAM(s) IV Intermittent every 8 hours  dextrose 5% + sodium chloride 0.45% with potassium chloride 20 mEq/L 1000 milliLiter(s) (100 mL/Hr) IV Continuous <Continuous>    MEDICATIONS  (PRN):      Allergies    No Known Allergies        Vital Signs Last 24 Hrs  T(C): 36.9 (17 Aug 2017 13:30), Max: 37.1 (16 Aug 2017 17:23)  T(F): 98.5 (17 Aug 2017 13:30), Max: 98.8 (16 Aug 2017 17:23)  HR: 55 (17 Aug 2017 13:30) (55 - 79)  BP: 101/59 (17 Aug 2017 13:30) (101/59 - 117/77)  BP(mean): --  RR: 18 (17 Aug 2017 13:30) (16 - 20)  SpO2: 97% (17 Aug 2017 13:30) (95% - 100%)    PHYSICAL EXAM:      Constitutional: NAD, well-developed  HEENT: anicteric  Gastrointestinal: soft; diffuse tenderness greatest in suprapubic region with rebound and guarding.  Extremities: No peripheral edema, neg clubing, cyanosis  Neurological: A/O x 3, no focal deficits  Psychiatric: Normal mood, normal affect  Skin: No rashes    LABS:                        10.9   9.88  )-----------( 276      ( 17 Aug 2017 08:36 )             34.3     Hemoglobin: 10.9 g/dL (08-17 @ 08:36)  Hemoglobin: 12.8 g/dL (08-16 @ 18:31)      08-17    140  |  103  |  9   ----------------------------<  115<H>  4.3   |  25  |  0.83    Ca    9.0      17 Aug 2017 08:34  Phos  2.8     08-17  Mg     2.4     08-17    TPro  7.7  /  Alb  4.3  /  TBili  0.6  /  DBili  x   /  AST  18  /  ALT  13  /  AlkPhos  95  08-16    Bilirubin Total, Serum: 0.6 mg/dL (08-16 @ 18:31)      Aspartate Aminotransferase (AST/SGOT): 18 U/L (08-16 @ 18:31)    Alanine Aminotransferase (ALT/SGPT): 13 U/L  (08-16 @ 18:31)          RADIOLOGY & ADDITIONAL TESTS:

## 2017-08-18 NOTE — DISCHARGE NOTE ADULT - INSTRUCTIONS
Low Fiber diet Follow up with MD. Take Augmentin 875mg orally as prescribed, finish all medication.  Monitor incision. Notify MD of any redness or drainage from incision, fevers or vomiting. Follow up with MD. Take Augmentin 875mg orally as prescribed, finish all medication.

## 2017-08-18 NOTE — DISCHARGE NOTE ADULT - PLAN OF CARE
Please follow up with Dr Friedman in 10 days--call for any exacerbation of symptoms. You will need a segmental sigmoid resection in approx 6 weeks.  Follow up with Dr Hutchison to discuss your pathology results (colonoscopy) continue antibiotics as prescribed

## 2017-08-22 LAB
CULTURE RESULTS: SIGNIFICANT CHANGE UP
CULTURE RESULTS: SIGNIFICANT CHANGE UP
SPECIMEN SOURCE: SIGNIFICANT CHANGE UP
SPECIMEN SOURCE: SIGNIFICANT CHANGE UP

## 2017-11-09 PROBLEM — K57.92 DIVERTICULITIS OF INTESTINE, PART UNSPECIFIED, WITHOUT PERFORATION OR ABSCESS WITHOUT BLEEDING: Chronic | Status: ACTIVE | Noted: 2017-08-16

## 2018-01-05 ENCOUNTER — APPOINTMENT (OUTPATIENT)
Dept: MAMMOGRAPHY | Facility: CLINIC | Age: 43
End: 2018-01-05
Payer: COMMERCIAL

## 2018-01-05 ENCOUNTER — OUTPATIENT (OUTPATIENT)
Dept: OUTPATIENT SERVICES | Facility: HOSPITAL | Age: 43
LOS: 1 days | End: 2018-01-05
Payer: COMMERCIAL

## 2018-01-05 DIAGNOSIS — Z98.890 OTHER SPECIFIED POSTPROCEDURAL STATES: Chronic | ICD-10-CM

## 2018-01-05 DIAGNOSIS — Z00.8 ENCOUNTER FOR OTHER GENERAL EXAMINATION: ICD-10-CM

## 2018-01-05 DIAGNOSIS — Z90.10 ACQUIRED ABSENCE OF UNSPECIFIED BREAST AND NIPPLE: Chronic | ICD-10-CM

## 2018-01-05 PROCEDURE — G0279: CPT

## 2018-01-05 PROCEDURE — G0279: CPT | Mod: 26

## 2018-01-05 PROCEDURE — 77066 DX MAMMO INCL CAD BI: CPT

## 2018-01-05 PROCEDURE — 77066 DX MAMMO INCL CAD BI: CPT | Mod: 26

## 2018-04-23 ENCOUNTER — RESULT REVIEW (OUTPATIENT)
Age: 43
End: 2018-04-23

## 2019-02-25 ENCOUNTER — OUTPATIENT (OUTPATIENT)
Dept: OUTPATIENT SERVICES | Facility: HOSPITAL | Age: 44
LOS: 1 days | End: 2019-02-25
Payer: COMMERCIAL

## 2019-02-25 ENCOUNTER — APPOINTMENT (OUTPATIENT)
Dept: MAMMOGRAPHY | Facility: CLINIC | Age: 44
End: 2019-02-25
Payer: COMMERCIAL

## 2019-02-25 DIAGNOSIS — Z90.10 ACQUIRED ABSENCE OF UNSPECIFIED BREAST AND NIPPLE: Chronic | ICD-10-CM

## 2019-02-25 DIAGNOSIS — Z00.8 ENCOUNTER FOR OTHER GENERAL EXAMINATION: ICD-10-CM

## 2019-02-25 DIAGNOSIS — Z98.890 OTHER SPECIFIED POSTPROCEDURAL STATES: Chronic | ICD-10-CM

## 2019-02-25 PROCEDURE — 77063 BREAST TOMOSYNTHESIS BI: CPT

## 2019-02-25 PROCEDURE — 77067 SCR MAMMO BI INCL CAD: CPT | Mod: 26

## 2019-02-25 PROCEDURE — 77063 BREAST TOMOSYNTHESIS BI: CPT | Mod: 26

## 2019-02-25 PROCEDURE — 77067 SCR MAMMO BI INCL CAD: CPT

## 2019-12-30 ENCOUNTER — RESULT REVIEW (OUTPATIENT)
Age: 44
End: 2019-12-30

## 2020-07-27 NOTE — ED PROVIDER NOTE - NS ED MD EM SELECTION
[id number] [facility]

## 2020-08-10 ENCOUNTER — OUTPATIENT (OUTPATIENT)
Dept: OUTPATIENT SERVICES | Facility: HOSPITAL | Age: 45
LOS: 1 days | End: 2020-08-10
Payer: COMMERCIAL

## 2020-08-10 ENCOUNTER — APPOINTMENT (OUTPATIENT)
Dept: MAMMOGRAPHY | Facility: CLINIC | Age: 45
End: 2020-08-10
Payer: COMMERCIAL

## 2020-08-10 DIAGNOSIS — Z98.890 OTHER SPECIFIED POSTPROCEDURAL STATES: Chronic | ICD-10-CM

## 2020-08-10 DIAGNOSIS — Z00.8 ENCOUNTER FOR OTHER GENERAL EXAMINATION: ICD-10-CM

## 2020-08-10 DIAGNOSIS — Z90.10 ACQUIRED ABSENCE OF UNSPECIFIED BREAST AND NIPPLE: Chronic | ICD-10-CM

## 2020-08-10 PROCEDURE — 77063 BREAST TOMOSYNTHESIS BI: CPT | Mod: 26

## 2020-08-10 PROCEDURE — 77067 SCR MAMMO BI INCL CAD: CPT

## 2020-08-10 PROCEDURE — 77063 BREAST TOMOSYNTHESIS BI: CPT

## 2020-08-10 PROCEDURE — 77067 SCR MAMMO BI INCL CAD: CPT | Mod: 26

## 2020-09-24 ENCOUNTER — RESULT REVIEW (OUTPATIENT)
Age: 45
End: 2020-09-24

## 2021-08-17 ENCOUNTER — OUTPATIENT (OUTPATIENT)
Dept: OUTPATIENT SERVICES | Facility: HOSPITAL | Age: 46
LOS: 1 days | End: 2021-08-17
Payer: COMMERCIAL

## 2021-08-17 ENCOUNTER — APPOINTMENT (OUTPATIENT)
Dept: MAMMOGRAPHY | Facility: CLINIC | Age: 46
End: 2021-08-17
Payer: COMMERCIAL

## 2021-08-17 DIAGNOSIS — Z98.890 OTHER SPECIFIED POSTPROCEDURAL STATES: Chronic | ICD-10-CM

## 2021-08-17 DIAGNOSIS — Z90.10 ACQUIRED ABSENCE OF UNSPECIFIED BREAST AND NIPPLE: Chronic | ICD-10-CM

## 2021-08-17 DIAGNOSIS — Z00.8 ENCOUNTER FOR OTHER GENERAL EXAMINATION: ICD-10-CM

## 2021-08-17 PROCEDURE — 77063 BREAST TOMOSYNTHESIS BI: CPT | Mod: 26

## 2021-08-17 PROCEDURE — 77067 SCR MAMMO BI INCL CAD: CPT | Mod: 26

## 2021-08-17 PROCEDURE — 77063 BREAST TOMOSYNTHESIS BI: CPT

## 2021-08-17 PROCEDURE — 77067 SCR MAMMO BI INCL CAD: CPT

## 2021-11-15 ENCOUNTER — RESULT REVIEW (OUTPATIENT)
Age: 46
End: 2021-11-15

## 2022-03-16 ENCOUNTER — EMERGENCY (EMERGENCY)
Facility: HOSPITAL | Age: 47
LOS: 1 days | Discharge: HOME | End: 2022-03-16
Attending: EMERGENCY MEDICINE | Admitting: EMERGENCY MEDICINE
Payer: COMMERCIAL

## 2022-03-16 VITALS
SYSTOLIC BLOOD PRESSURE: 104 MMHG | RESPIRATION RATE: 18 BRPM | WEIGHT: 184.97 LBS | DIASTOLIC BLOOD PRESSURE: 67 MMHG | OXYGEN SATURATION: 100 % | HEART RATE: 69 BPM | TEMPERATURE: 99 F

## 2022-03-16 DIAGNOSIS — Z90.10 ACQUIRED ABSENCE OF UNSPECIFIED BREAST AND NIPPLE: Chronic | ICD-10-CM

## 2022-03-16 DIAGNOSIS — Z98.890 OTHER SPECIFIED POSTPROCEDURAL STATES: Chronic | ICD-10-CM

## 2022-03-16 LAB
ALBUMIN SERPL ELPH-MCNC: 3.7 G/DL — SIGNIFICANT CHANGE UP (ref 3.3–5)
ALP SERPL-CCNC: 119 U/L — SIGNIFICANT CHANGE UP (ref 40–120)
ALT FLD-CCNC: 27 U/L — SIGNIFICANT CHANGE UP (ref 12–78)
ANION GAP SERPL CALC-SCNC: 10 MMOL/L — SIGNIFICANT CHANGE UP (ref 5–17)
AST SERPL-CCNC: 17 U/L — SIGNIFICANT CHANGE UP (ref 15–37)
BASOPHILS # BLD AUTO: 0.03 K/UL — SIGNIFICANT CHANGE UP (ref 0–0.2)
BASOPHILS NFR BLD AUTO: 0.3 % — SIGNIFICANT CHANGE UP (ref 0–2)
BILIRUB SERPL-MCNC: 0.5 MG/DL — SIGNIFICANT CHANGE UP (ref 0.2–1.2)
BUN SERPL-MCNC: 18 MG/DL — SIGNIFICANT CHANGE UP (ref 7–23)
CALCIUM SERPL-MCNC: 9.2 MG/DL — SIGNIFICANT CHANGE UP (ref 8.5–10.1)
CHLORIDE SERPL-SCNC: 106 MMOL/L — SIGNIFICANT CHANGE UP (ref 96–108)
CO2 SERPL-SCNC: 21 MMOL/L — LOW (ref 22–31)
CREAT SERPL-MCNC: 0.84 MG/DL — SIGNIFICANT CHANGE UP (ref 0.5–1.3)
EGFR: 86 ML/MIN/1.73M2 — SIGNIFICANT CHANGE UP
EOSINOPHIL # BLD AUTO: 0.01 K/UL — SIGNIFICANT CHANGE UP (ref 0–0.5)
EOSINOPHIL NFR BLD AUTO: 0.1 % — SIGNIFICANT CHANGE UP (ref 0–6)
GLUCOSE SERPL-MCNC: 117 MG/DL — HIGH (ref 70–99)
HCG SERPL-ACNC: <1 MIU/ML — SIGNIFICANT CHANGE UP
HCT VFR BLD CALC: 38 % — SIGNIFICANT CHANGE UP (ref 34.5–45)
HGB BLD-MCNC: 12.8 G/DL — SIGNIFICANT CHANGE UP (ref 11.5–15.5)
IMM GRANULOCYTES NFR BLD AUTO: 0.4 % — SIGNIFICANT CHANGE UP (ref 0–1.5)
LIDOCAIN IGE QN: 49 U/L — LOW (ref 73–393)
LYMPHOCYTES # BLD AUTO: 1.07 K/UL — SIGNIFICANT CHANGE UP (ref 1–3.3)
LYMPHOCYTES # BLD AUTO: 9.6 % — LOW (ref 13–44)
MCHC RBC-ENTMCNC: 29.3 PG — SIGNIFICANT CHANGE UP (ref 27–34)
MCHC RBC-ENTMCNC: 33.7 GM/DL — SIGNIFICANT CHANGE UP (ref 32–36)
MCV RBC AUTO: 87 FL — SIGNIFICANT CHANGE UP (ref 80–100)
MONOCYTES # BLD AUTO: 0.57 K/UL — SIGNIFICANT CHANGE UP (ref 0–0.9)
MONOCYTES NFR BLD AUTO: 5.1 % — SIGNIFICANT CHANGE UP (ref 2–14)
NEUTROPHILS # BLD AUTO: 9.43 K/UL — HIGH (ref 1.8–7.4)
NEUTROPHILS NFR BLD AUTO: 84.5 % — HIGH (ref 43–77)
NRBC # BLD: 0 /100 WBCS — SIGNIFICANT CHANGE UP (ref 0–0)
PLATELET # BLD AUTO: 374 K/UL — SIGNIFICANT CHANGE UP (ref 150–400)
POTASSIUM SERPL-MCNC: 3.8 MMOL/L — SIGNIFICANT CHANGE UP (ref 3.5–5.3)
POTASSIUM SERPL-SCNC: 3.8 MMOL/L — SIGNIFICANT CHANGE UP (ref 3.5–5.3)
PROT SERPL-MCNC: 7.9 G/DL — SIGNIFICANT CHANGE UP (ref 6–8.3)
RBC # BLD: 4.37 M/UL — SIGNIFICANT CHANGE UP (ref 3.8–5.2)
RBC # FLD: 12.8 % — SIGNIFICANT CHANGE UP (ref 10.3–14.5)
SODIUM SERPL-SCNC: 137 MMOL/L — SIGNIFICANT CHANGE UP (ref 135–145)
WBC # BLD: 11.15 K/UL — HIGH (ref 3.8–10.5)
WBC # FLD AUTO: 11.15 K/UL — HIGH (ref 3.8–10.5)

## 2022-03-16 PROCEDURE — 74177 CT ABD & PELVIS W/CONTRAST: CPT | Mod: 26,MA

## 2022-03-16 PROCEDURE — 99285 EMERGENCY DEPT VISIT HI MDM: CPT

## 2022-03-16 RX ORDER — ONDANSETRON 8 MG/1
4 TABLET, FILM COATED ORAL ONCE
Refills: 0 | Status: COMPLETED | OUTPATIENT
Start: 2022-03-16 | End: 2022-03-16

## 2022-03-16 RX ORDER — SODIUM CHLORIDE 9 MG/ML
1000 INJECTION INTRAMUSCULAR; INTRAVENOUS; SUBCUTANEOUS ONCE
Refills: 0 | Status: DISCONTINUED | OUTPATIENT
Start: 2022-03-16 | End: 2022-03-16

## 2022-03-16 RX ORDER — FAMOTIDINE 10 MG/ML
20 INJECTION INTRAVENOUS ONCE
Refills: 0 | Status: COMPLETED | OUTPATIENT
Start: 2022-03-16 | End: 2022-03-16

## 2022-03-16 RX ORDER — SODIUM CHLORIDE 9 MG/ML
1000 INJECTION INTRAMUSCULAR; INTRAVENOUS; SUBCUTANEOUS ONCE
Refills: 0 | Status: COMPLETED | OUTPATIENT
Start: 2022-03-16 | End: 2022-03-16

## 2022-03-16 RX ORDER — ACETAMINOPHEN 500 MG
1000 TABLET ORAL ONCE
Refills: 0 | Status: COMPLETED | OUTPATIENT
Start: 2022-03-16 | End: 2022-03-16

## 2022-03-16 RX ADMIN — FAMOTIDINE 20 MILLIGRAM(S): 10 INJECTION INTRAVENOUS at 21:38

## 2022-03-16 RX ADMIN — SODIUM CHLORIDE 1000 MILLILITER(S): 9 INJECTION INTRAMUSCULAR; INTRAVENOUS; SUBCUTANEOUS at 21:33

## 2022-03-16 RX ADMIN — SODIUM CHLORIDE 1000 MILLILITER(S): 9 INJECTION INTRAMUSCULAR; INTRAVENOUS; SUBCUTANEOUS at 22:33

## 2022-03-16 RX ADMIN — Medication 1000 MILLIGRAM(S): at 23:10

## 2022-03-16 RX ADMIN — Medication 400 MILLIGRAM(S): at 21:53

## 2022-03-16 RX ADMIN — Medication 1000 MILLIGRAM(S): at 22:08

## 2022-03-16 RX ADMIN — ONDANSETRON 4 MILLIGRAM(S): 8 TABLET, FILM COATED ORAL at 21:37

## 2022-03-16 RX ADMIN — SODIUM CHLORIDE 1000 MILLILITER(S): 9 INJECTION INTRAMUSCULAR; INTRAVENOUS; SUBCUTANEOUS at 21:53

## 2022-03-16 RX ADMIN — SODIUM CHLORIDE 1000 MILLILITER(S): 9 INJECTION INTRAMUSCULAR; INTRAVENOUS; SUBCUTANEOUS at 23:16

## 2022-03-16 NOTE — ED PROVIDER NOTE - CARE PROVIDERS DIRECT ADDRESSES
[email or web address]

## 2022-03-16 NOTE — ED PROVIDER NOTE - PATIENT PORTAL LINK FT
You can access the FollowMyHealth Patient Portal offered by [facility] by registering at the following website: [email or web address]. By joining [employer]’s FollowMyHealth portal, you will also be able to view your health information using other applications (apps) compatible with our system.

## 2022-03-16 NOTE — ED PROVIDER NOTE - CARE PROVIDER_API CALL
Sebastian Hutchison)  Gastroenterology; Internal Medicine  [address] [place] [address]  Phone: [phone]  Fax: [phone]  Follow Up Time:

## 2022-03-16 NOTE — ED PROVIDER NOTE - NSFOLLOWUPINSTRUCTIONS_ED_ALL_ED_FT
Food poisoning is an illness that is caused by eating or drinking contaminated foods or drinks. In most cases, food poisoning is mild and lasts 1–2 days. However, some cases can be serious, especially for people who have weak body defense systems (immune systems), older people, children and infants, and pregnant women.      What are the causes?    This condition is caused by contaminated food. Foods can become contaminated with viruses, bacteria, parasites, or mold due to:  •Poor personal hygiene, such as poor hand-washing practices.      •Storing food improperly, such as not refrigerating raw meat.      •Using unclean surfaces for preparing, serving, and storing food.      •Cooking or eating with unclean utensils.      If contaminated food is eaten, viruses, bacteria, or parasites can harm the intestine. This often causes severe diarrhea. The most common causes of food poisoning include:•Viruses, such as:  •Norovirus.      •Rotavirus.      •Bacteria, such as:  •Salmonella.      •Listeria.      •E. coli (Escherichia coli).      •Parasites, such as:  •Giardia.      •Toxoplasma gondii.          What are the signs or symptoms?    Symptoms may take several hours to appear after you consume contaminated food or drink. Symptoms include:  •Nausea.      •Vomiting.      •Cramping.      •Diarrhea.      •Fever and chills.      •Muscle aches.      •Dehydration. Dehydration can cause you to be tired and thirsty, have a dry mouth, and urinate less frequently.        How is this diagnosed?    Your health care provider can diagnose food poisoning with your medical history and a physical exam. This will include asking you what you have recently eaten. You may also have tests, including:  •Blood tests.      •Stool tests.        How is this treated?    Treatment focuses on relieving your symptoms and making sure that you are hydrated. You may also be given medicines. In severe cases, hospitalization may be required and you may need to receive fluids through an IV.      Follow these instructions at home:      Eating and drinking      •Drink enough fluids to keep your urine pale yellow. You may need to drink small amounts of clear liquids frequently.      •Avoid milk, caffeine, and alcohol.      •Ask your health care provider for specific rehydration instructions.      •Eat small, frequent meals rather than large meals.      Medicines     •Take over-the-counter and prescription medicines only as told by your health care provider. Ask your health care provider if you should continue to take any of your regular prescribed and over-the-counter medicines.      •If you were prescribed an antibiotic medicine, take it as told by your health care provider. Do not stop taking the antibiotic even if you start to feel better.        General instructions      •Wash your hands thoroughly before you prepare food and after you go to the bathroom (use the toilet). Make sure that the people who live with you also wash their hands often.      •Rest at home until you feel better.      •Clean surfaces that you touch with a product that contains chlorine bleach.      •Keep all follow-up visits as told by your health care provider. This is important.        How is this prevented?    •Wash your hands, food preparation surfaces, and utensils thoroughly before and after you handle raw foods.      •Use separate food preparation surfaces and storage spaces for raw meat and for fruits and vegetables.      •Keep refrigerated foods colder than 40°F (5°C).      •Serve hot foods immediately or keep them heated above 140°F (60°C).      •Store dry foods in cool, dry spaces away from excess heat or moisture. Throw out any foods that do not smell right or are in cans that are bulging.      •Follow approved timoteo procedures.      •Heat canned foods thoroughly before you taste them.      •Drink bottled or sterile water when you travel.        Get help right away if:    •You have difficulty breathing, swallowing, talking, or moving.      •You develop blurred vision.      •You cannot eat or drink without vomiting.      •You faint.      •Your eyes turn yellow.      •Your vomiting or diarrhea is persistent.      •Abdominal pain develops, increases, or localizes in one small area.      •You have a fever.      •You have blood or mucus in your stools, or your stools look dark black and tarry.    •You have signs of dehydration, such as:  •Dark urine, very little urine, or no urine.      •Cracked lips.      •Not making tears while crying.      •Dry mouth.      •Sunken eyes.      •Sleepiness.      •Weakness.      •Dizziness.        These symptoms may represent a serious problem that is an emergency. Do not wait to see if the symptoms will go away. Get medical help right away. Call your local emergency services (911 in the [place] Do not drive yourself to the hospital.       Summary    •Food poisoning is an illness that is caused by eating or drinking contaminated foods or drinks.      •Symptoms may include nausea, vomiting, diarrhea, muscle aches, cramping, fever, chills, and dehydration.      •In most cases, food poisoning is mild and lasts 1–2 days.      •In severe cases, hospitalization may be required.      This information is not intended to replace advice given to you by your health care provider. Make sure you discuss any questions you have with your health care provider.          A full liquid diet refers to fluids and foods that are liquid, or will become liquid, at room temperature. This diet should only be used for a short period of time to help you recover from illness or surgery. Your health care provider or dietitian will help determine when it is safe to eat regular foods again.      What are tips for following this plan?    Reading food labels     •Check food labels of nutrition shakes for the amount of protein. Look for nutrition shakes that have at least 8–10 grams of protein in each serving.      •Choose drinks, such as milks and juices, that are "fortified" or "enriched." This means that vitamins and minerals have been added.      Shopping     •Buy pre-made nutrition shakes to keep on hand.      •To vary your choices, buy different flavors of milks and shakes.      Meal planning     •Choose flavors and foods that you enjoy.    •To make sure you get enough energy and calories from food:  •Have three full liquid meals each day. Have a liquid snack between each meal.      •Drink 6–8 oz (177–237 mL) of a nutritional supplement shake with meals or as snacks.      •Add protein powder, powdered milk, milk, or yogurt to shakes to increase the amount of protein.        •Drink at least one serving a day of citrus fruit juice or fruit juice that has vitamin C added.      General guidelines     •Before starting the full liquid diet, check with your health care provider to know what foods you should avoid. These may include full-fat or high-fiber liquids.      •You may have any liquid or food that becomes a liquid at room temperature. The food is considered a liquid if it can be poured off a spoon at room temperature.      • Do not drink alcohol unless approved by your health care provider.    •This diet gives you most of the nutrients that you need for energy, but you may not get enough of certain vitamins, minerals, and fiber. Make sure to talk to your health care provider or dietitian about:  •How many calories you need to eat each day.      •How much fluid you should have each day.      •Taking a multivitamin or a nutritional supplement.          What foods should I eat?     Fruits     Fruit juice without pulp. Strained fruit purées (seeds and skins removed).    Vegetables     Pulp-free tomato or vegetable juice. Vegetables puréed in soup.    Grains     Thin, hot cereal, such as farina. Soft-cooked pasta or rice puréed in soup.    Meats and other proteins     Beef, chicken, and fish broths. Powdered protein supplements.    Dairy     Milk and milk-based beverages, including milk shakes and instant breakfast mixes. Smooth yogurt. Puréed cottage cheese.    Fats and oils     Melted margarine and butter. Cream. Canola, almond, avocado, corn, grapeseed, sunflower, and sesame oils. Gravy.    Beverages     Water. Coffee and tea (caffeinated or decaffeinated). Cocoa. Liquid nutritional supplements. Soft drinks. Nondairy milks, such as almond, coconut, rice, or soy milk.    Sweets and desserts     Custard. Pudding. Flavored gelatin. Smooth ice cream (without nuts or candy pieces). Sherbet. Frozen ice pops. Italian ice. Pudding pops.    Seasonings and condiments     Salt and pepper. Spices. Vinegar. Ketchup. Yellow mustard. Smooth sauces, such as Hollandaise, cheese sauce, or white sauce. Soy sauce. Syrup. Honey. Jelly (without fruit pieces).    Other foods     Cocoa powder. Cream soups. Strained soups.    The items listed above may not be a complete list of foods and beverages you can eat. Contact a dietitian for more information.       What foods should I avoid?    Fruits     All whole fresh, frozen, or canned fruits.    Vegetables     All whole fresh, frozen, or canned vegetables.    Grains     Whole grains. Pasta. Rice. Cold cereal. Bread. Crackers.    Meats and other proteins     All cuts of meat, poultry, and fish. Eggs. Tofu and soy protein. Nuts and nut butters. Precooked or cured meat, such as sausages or meat loaves.    Dairy     Hard cheese. Yogurt with fruit chunks.    Fats and oils     Coconut oil. Palm oil. Lard. Cold butter.    Sweets and desserts     Ice cream or other frozen desserts that contain solids, such as nuts, chocolate chips, and pieces of cookies. Cakes. Cookies. Candy.    Seasonings and condiments     Stone-ground mustard.    Other foods     Soups with chunks or pieces.    The items listed above may not be a complete list of foods and beverages you should avoid. Contact a dietitian for more information.       Summary    •A full liquid diet refers to fluids and foods that are liquid or will become liquid at room temperature.      •This diet should only be used for a short period of time to help you recover from illness or surgery. Ask your health care provider or dietitian when it is safe for you to eat regular foods.      •To make sure you get enough calories and nutrients, eat three meals each day with snacks in between. Drink pre-made nutritional supplement shakes or add protein powder to homemade shakes. Talk to your health care provider about taking a vitamin and mineral supplement.      This information is not intended to replace advice given to you by your health care provider. Make sure you discuss any questions you have with your health care provider.      Document Revised: 10/05/2021 Document Reviewed: 10/05/2021    [employer] Patient Education © 2022 [employer]

## 2022-03-16 NOTE — ED ADULT NURSE NOTE - OBJECTIVE STATEMENT
Pt presents to the ED s/p abd  pain, nausea, vomiting and diarrhea after having had cake from a supermarket last night. Her symptoms didn't appear until today.

## 2022-03-16 NOTE — ED PROVIDER NOTE - OBJECTIVE STATEMENT
46 female presents to ER c/o nausea, vomiting, diarrhea, states it started yesterday after eating pizza and cake, patient unable to tolerate po, having light headedness, had near syncopal episode where she lowered herself to the ground, having lower abdominal discomfort, states she is currently on provera which she is taking for vaginal bleeding which has improved.

## 2022-03-16 NOTE — ED PROVIDER NOTE - PROGRESS NOTE DETAILS
patient feeling better, labs, ct scan discussed, agrees to f/u with her GI, understands to return to ER for worsening of symptoms

## 2022-03-17 VITALS
HEART RATE: 68 BPM | OXYGEN SATURATION: 98 % | RESPIRATION RATE: 16 BRPM | SYSTOLIC BLOOD PRESSURE: 145 MMHG | TEMPERATURE: 98 F | DIASTOLIC BLOOD PRESSURE: 83 MMHG

## 2022-03-17 PROCEDURE — 80053 COMPREHEN METABOLIC PANEL: CPT

## 2022-03-17 PROCEDURE — 96361 HYDRATE IV INFUSION ADD-ON: CPT

## 2022-03-17 PROCEDURE — 99284 EMERGENCY DEPT VISIT MOD MDM: CPT | Mod: 25

## 2022-03-17 PROCEDURE — 85025 COMPLETE CBC W/AUTO DIFF WBC: CPT

## 2022-03-17 PROCEDURE — 36415 COLL VENOUS BLD VENIPUNCTURE: CPT

## 2022-03-17 PROCEDURE — 83690 ASSAY OF LIPASE: CPT

## 2022-03-17 PROCEDURE — 84702 CHORIONIC GONADOTROPIN TEST: CPT

## 2022-03-17 PROCEDURE — 96374 THER/PROPH/DIAG INJ IV PUSH: CPT | Mod: XU

## 2022-03-17 PROCEDURE — 96375 TX/PRO/DX INJ NEW DRUG ADDON: CPT

## 2022-03-17 PROCEDURE — 74177 CT ABD & PELVIS W/CONTRAST: CPT | Mod: MA

## 2022-03-17 RX ORDER — SODIUM CHLORIDE 9 MG/ML
1000 INJECTION INTRAMUSCULAR; INTRAVENOUS; SUBCUTANEOUS ONCE
Refills: 0 | Status: COMPLETED | OUTPATIENT
Start: 2022-03-17 | End: 2022-03-17

## 2022-03-17 RX ORDER — METOCLOPRAMIDE HCL 10 MG
10 TABLET ORAL ONCE
Refills: 0 | Status: COMPLETED | OUTPATIENT
Start: 2022-03-17 | End: 2022-03-17

## 2022-03-17 RX ORDER — ONDANSETRON 8 MG/1
1 TABLET, FILM COATED ORAL
Qty: 15 | Refills: 0
Start: 2022-03-17 | End: 2022-03-21

## 2022-03-17 RX ADMIN — SODIUM CHLORIDE 1000 MILLILITER(S): 9 INJECTION INTRAMUSCULAR; INTRAVENOUS; SUBCUTANEOUS at 00:16

## 2022-03-17 RX ADMIN — SODIUM CHLORIDE 1000 MILLILITER(S): 9 INJECTION INTRAMUSCULAR; INTRAVENOUS; SUBCUTANEOUS at 01:02

## 2022-03-17 RX ADMIN — Medication 10 MILLIGRAM(S): at 00:21

## 2022-10-15 ENCOUNTER — APPOINTMENT (OUTPATIENT)
Dept: MAMMOGRAPHY | Facility: CLINIC | Age: 47
End: 2022-10-15

## 2022-10-15 ENCOUNTER — OUTPATIENT (OUTPATIENT)
Dept: OUTPATIENT SERVICES | Facility: HOSPITAL | Age: 47
LOS: 1 days | End: 2022-10-15
Payer: MEDICARE

## 2022-10-15 DIAGNOSIS — Z98.890 OTHER SPECIFIED POSTPROCEDURAL STATES: Chronic | ICD-10-CM

## 2022-10-15 DIAGNOSIS — Z00.8 ENCOUNTER FOR OTHER GENERAL EXAMINATION: ICD-10-CM

## 2022-10-15 DIAGNOSIS — Z12.31 ENCOUNTER FOR SCREENING MAMMOGRAM FOR MALIGNANT NEOPLASM OF BREAST: ICD-10-CM

## 2022-10-15 DIAGNOSIS — Z90.10 ACQUIRED ABSENCE OF UNSPECIFIED BREAST AND NIPPLE: Chronic | ICD-10-CM

## 2022-10-15 PROCEDURE — 77063 BREAST TOMOSYNTHESIS BI: CPT | Mod: 26

## 2022-10-15 PROCEDURE — 77063 BREAST TOMOSYNTHESIS BI: CPT

## 2022-10-15 PROCEDURE — 77067 SCR MAMMO BI INCL CAD: CPT

## 2022-10-15 PROCEDURE — 77067 SCR MAMMO BI INCL CAD: CPT | Mod: 26

## 2023-10-21 ENCOUNTER — APPOINTMENT (OUTPATIENT)
Dept: MAMMOGRAPHY | Facility: CLINIC | Age: 48
End: 2023-10-21
Payer: COMMERCIAL

## 2023-10-21 ENCOUNTER — OUTPATIENT (OUTPATIENT)
Dept: OUTPATIENT SERVICES | Facility: HOSPITAL | Age: 48
LOS: 1 days | End: 2023-10-21
Payer: COMMERCIAL

## 2023-10-21 DIAGNOSIS — Z90.10 ACQUIRED ABSENCE OF UNSPECIFIED BREAST AND NIPPLE: Chronic | ICD-10-CM

## 2023-10-21 DIAGNOSIS — Z00.8 ENCOUNTER FOR OTHER GENERAL EXAMINATION: ICD-10-CM

## 2023-10-21 DIAGNOSIS — Z98.890 OTHER SPECIFIED POSTPROCEDURAL STATES: Chronic | ICD-10-CM

## 2023-10-21 DIAGNOSIS — Z12.31 ENCOUNTER FOR SCREENING MAMMOGRAM FOR MALIGNANT NEOPLASM OF BREAST: ICD-10-CM

## 2023-10-21 PROCEDURE — 77067 SCR MAMMO BI INCL CAD: CPT

## 2023-10-21 PROCEDURE — 77063 BREAST TOMOSYNTHESIS BI: CPT | Mod: 26

## 2023-10-21 PROCEDURE — 77063 BREAST TOMOSYNTHESIS BI: CPT

## 2023-10-21 PROCEDURE — 77067 SCR MAMMO BI INCL CAD: CPT | Mod: 26

## 2024-11-04 ENCOUNTER — APPOINTMENT (OUTPATIENT)
Dept: MAMMOGRAPHY | Facility: CLINIC | Age: 49
End: 2024-11-04
Payer: MEDICAID

## 2024-11-04 ENCOUNTER — OUTPATIENT (OUTPATIENT)
Dept: OUTPATIENT SERVICES | Facility: HOSPITAL | Age: 49
LOS: 1 days | End: 2024-11-04
Payer: MEDICAID

## 2024-11-04 DIAGNOSIS — Z90.10 ACQUIRED ABSENCE OF UNSPECIFIED BREAST AND NIPPLE: Chronic | ICD-10-CM

## 2024-11-04 DIAGNOSIS — Z12.31 ENCOUNTER FOR SCREENING MAMMOGRAM FOR MALIGNANT NEOPLASM OF BREAST: ICD-10-CM

## 2024-11-04 DIAGNOSIS — Z98.890 OTHER SPECIFIED POSTPROCEDURAL STATES: Chronic | ICD-10-CM

## 2024-11-04 PROCEDURE — 77063 BREAST TOMOSYNTHESIS BI: CPT

## 2024-11-04 PROCEDURE — 77067 SCR MAMMO BI INCL CAD: CPT | Mod: 26

## 2024-11-04 PROCEDURE — 77067 SCR MAMMO BI INCL CAD: CPT

## 2024-11-04 PROCEDURE — 77063 BREAST TOMOSYNTHESIS BI: CPT | Mod: 26

## 2025-05-15 NOTE — ED PROVIDER NOTE - CLINICAL SUMMARY MEDICAL DECISION MAKING FREE TEXT BOX
nausea, vomiting, diarrhrea, lower abdominal pain, h/o diverticultis, colectomy, f/u ct abdomen/pelvis, labs, iv fluids, anti-emetics, pepcid, tylneol, re-eval
88